# Patient Record
(demographics unavailable — no encounter records)

---

## 2025-04-24 NOTE — HISTORY OF PRESENT ILLNESS
[de-identified] : Date of Injury/Onset:1/2025--PATIENT FELL 3 MONTHS AGO HAD RIGHT HIP DARIN 2015 HSS Mechanism of injury: NKI Have you been treated for this in the past? Have you had surgery for this in the past? Physical Therapy/ HEP: YES- 4 MONTHS without relief  OTC Medicines: [facility]VE RX medicines: NONE Heat, Ice, Elevation: ICE AND HEAT CSI or Gel Injections:  None Other Previous Treatment:   Prior Imaging/Studies: XR/MRI [facility]   Pain: At Rest:1 /10 With Activity: 9-10/10 Quality of symptoms: C/O LEFT GROIN PAIN AND POSTERIOR PAIN, WEAKNESS, C/O RADIATION TO LEFT KNEE   Affecting Sleep: Yes Stiffness upon waking, lasting greater than 30mins: Yes Difficulty with stairs: Yes Difficulty getting in and out of car: Yes Sit to stand stiffness: Yes Affects walking short/long distances? Yes Home/Work/Recreation affected? Yes   Improves with:REST Worse with: EXERCISES, TENNIS/GOLF, STAIRS   This is not a Work-Related Injury being treated under Worker's Compensation. This is not an athletic injury occurring associated with an interscholastic or organized sports team.

## 2025-04-24 NOTE — DISCUSSION/SUMMARY
[de-identified] : Diagnosis Hip Osteoarthritis   BRIA HARTLEY 70 year  F was seen and evaluated in the office today. Following evaluation, and history of the patient's condition at length, the pathology was explained in full to the patient in layman's terms. Patient has Hip Osteoarthritis. Osteoarthritis is a degenerative joint disease where the cartilage in the hip gradually wears away, leading to pain, stiffness, and reduced mobility. Initially, symptoms may be mild, however this is a progressive condition, and the pain is expected to become more severe and persistent. Advanced stages of hip OA can result in significant disability, making daily activities challenging. I discussed with the patient that since this condition is expected to continue to worsen, they will eventually need a total hip replacement in their life time.   In the interim, discussed with patient several different treatment options regarding managing the gradual loss of function associated with HIP OA, along with specific risks and benefits. Nonsurgical options including but not limited to Corticosteroid Injection, Meloxicam 15mg, Medrol Dose Pack, along with activity modification such as non-impact exercise and organized physical therapy. The risk of morbidity associated with proposed treatments were discussed.   Furthermore, discussed with BRIA that they could also delay any immediate treatment options and continue to observe and self-care for the discussed problem. Discussed Home Exercise Programs as well as Rest, Ice and elevation. Patient had ample time to ask any questions about todays visit and the diagnosis, and all questions were answered. Patient verbally expressed they understand the discussion today and the plan going forward.   --   At this time, indicated patient for Meloxicam 15mg due to pain and inflammation of the hip.  -Patient was provided prescription for Meloxicam 15mg. There is a moderate risk of morbidity  from use of prescription strength medications. I recommended that the patient follow-up with their medical physician to discuss any significant specific potential issues with long term medication use such as interactions with current medications or with exacerbation of underlying medical comorbidities. The patient voiced their understanding of the risks including but not limited to bleeding, stroke, kidney dysfunction, heart disease. -- At this time, patient is indicated for physical therapy due to their reduced ROM and weakness. Discussed with patient the benefits of physical therapy due to their current pain and limited function. At this time after discussion of the options, the patient would benefit from organized Physical Therapy to increase overall functionality. The patient was provided with an Rx in office today and was instructed to attend PT for 6-8 weeks in order to increase strength and ROM. Patient agreed with this plan and will begin PT as soon as they can.  -- Upon discussion of options, the plan at this time is for the patient to follow up with interventional radiology. Patient was given a referral today and instructed to follow up with interventional radiology for intraarticular cortisone injection into the LEFT hip joint.  Patient was instructed to f/u 1 month after injection for continued evaluation. All questions were answered at this time.  -- Patient was advised to follow up in 2-3 months to further evaluate, or sooner if symptoms worsen.

## 2025-04-24 NOTE — DATA REVIEWED
[MRI] : MRI [Left] : left [Hip] : hip [FreeTextEntry1] : Severe left hip oa, bone on bone, with large joint effusion

## 2025-04-24 NOTE — PHYSICAL EXAM
[4___] : abduction 4[unfilled]/5 [5___] : adduction 5[unfilled]/5 [2+] : posterior tibialis pulse: 2+ [] : patient ambulates without assistive device [Left] : left hip with pelvis [AP] : anteroposterior [Lateral] : lateral [FreeTextEntry8] : ITB Insertion TTP Ant/Post Hip Joint [FreeTextEntry9] : [facility] 2/2025: Moderate to advanced left hip oa, head neck thickening, with osteophyte formation superior and inferior joint space.